# Patient Record
Sex: FEMALE | Race: WHITE | NOT HISPANIC OR LATINO | Employment: OTHER | URBAN - METROPOLITAN AREA
[De-identification: names, ages, dates, MRNs, and addresses within clinical notes are randomized per-mention and may not be internally consistent; named-entity substitution may affect disease eponyms.]

---

## 2020-12-31 ENCOUNTER — TRANSFERRED RECORDS (OUTPATIENT)
Dept: HEALTH INFORMATION MANAGEMENT | Facility: CLINIC | Age: 85
End: 2020-12-31

## 2021-03-26 ENCOUNTER — TRANSFERRED RECORDS (OUTPATIENT)
Dept: HEALTH INFORMATION MANAGEMENT | Facility: CLINIC | Age: 86
End: 2021-03-26

## 2021-06-21 ENCOUNTER — TRANSFERRED RECORDS (OUTPATIENT)
Dept: HEALTH INFORMATION MANAGEMENT | Facility: CLINIC | Age: 86
End: 2021-06-21

## 2021-06-25 ENCOUNTER — TRANSFERRED RECORDS (OUTPATIENT)
Dept: HEALTH INFORMATION MANAGEMENT | Facility: CLINIC | Age: 86
End: 2021-06-25

## 2021-06-28 ENCOUNTER — TRANSFERRED RECORDS (OUTPATIENT)
Dept: HEALTH INFORMATION MANAGEMENT | Facility: CLINIC | Age: 86
End: 2021-06-28

## 2021-07-01 ENCOUNTER — OFFICE VISIT (OUTPATIENT)
Dept: RADIATION THERAPY | Facility: OUTPATIENT CENTER | Age: 86
End: 2021-07-01
Payer: COMMERCIAL

## 2021-07-01 VITALS — DIASTOLIC BLOOD PRESSURE: 88 MMHG | HEART RATE: 65 BPM | SYSTOLIC BLOOD PRESSURE: 148 MMHG

## 2021-07-01 DIAGNOSIS — C18.9 MALIGNANT NEOPLASM OF COLON, UNSPECIFIED PART OF COLON (H): Primary | ICD-10-CM

## 2021-07-01 RX ORDER — METOPROLOL TARTRATE 50 MG
1 TABLET ORAL 2 TIMES DAILY
COMMUNITY

## 2021-07-01 RX ORDER — TRIAMCINOLONE ACETONIDE 1 MG/ML
LOTION TOPICAL
COMMUNITY

## 2021-07-01 RX ORDER — PRAMIPEXOLE DIHYDROCHLORIDE 0.25 MG/1
1-2 TABLET ORAL AT BEDTIME
COMMUNITY

## 2021-07-01 RX ORDER — FLUTICASONE PROPIONATE 50 MCG
SPRAY, SUSPENSION (ML) NASAL
COMMUNITY

## 2021-07-01 RX ORDER — HYDROCHLOROTHIAZIDE 12.5 MG/1
1 TABLET ORAL DAILY
COMMUNITY

## 2021-07-01 RX ORDER — SIMVASTATIN 20 MG
1 TABLET ORAL DAILY
COMMUNITY

## 2021-07-01 NOTE — PROGRESS NOTES
Consultation Note    Name: Nubia Moreno MRN: [id number]   : 1934   Date of Service: 2021      Reason for consultation: Consideration of palliative radiation therapy to the lymph node oligometastatic sites in the setting of diffuse metastatic adenocarcinoma of colon with hepatic and multiple lymphadenopathy status post mood response on systemic therapy.    History of Present Illness   Ms. 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  Her most recent imaging showed progression on pembrolizumab monotherapy.     Her oncologic history started in 2017 where she presented with unintentional 20 Ib weight loss. Lab work-up showed microcytic anemia. A colonoscopy on 7/15/2017 showed multiple lesions occupying the cecum, hepatic flexure and ascending colon. Biopsy was obtained and showed poorly differentiated adenocarcinoma in the hepatic flexure and moderately differentiated adenocarcinoma in the cecal and descending colon areas.  Staging CT abdomen and pelvis on 2017 showed evidence of multiple retroperitoneal lymph nodes, right ovarian mass as well as a 2 cm lesion in the dome of the liver. CT chest showed no evidence of pulmonary metastasis.      The patient underwent subtotal colectomy on 2017. Surgical pathology showed 4 foci of tumors with a 4.5 cm high-grade hepatic flexure adenocarcinoma, 3 cm cecal tumor, 3 cm ascending colon tumor and 1.6 cm descending colon low-grade adenocarcinoma. There was evident LVSI in the hepatic flexure and cecal primary as well as positive PNI in the cecal primary tumors.  There was positive 6/24 dissected nodes, negative final surgical margins.  Genetic analysis showed K-parvez wild-type and BRAF mutated.  CEA was 13.6 on 1017.    Staging PET/CT on  8/25/2017 showed multiple FDG avid liver metastasis, retroperitoneal lymphadenopathy, suspicious right hilar node as well as suspicious left 10th rib uptake.  CT-guided biopsy from retroperitoneal lymph node on 9/8/2017 was consistent with metastatic adenocarcinoma of colon primary.    The patient completed 6 cycles of Xeloda and Avastin (9/20/2017-1/24/2018).  Interval imaging on 4/13/2018 showed mixed response involving enlarged mesenteric lymphadenopathy and stable retroperitoneal, periportal and pericaval lymph nodes.    She was then switched to pembrolizumab monotherapy since 4/23/2018.  She completed 10 cycles (last cycle on 6/21/2021).  Most recent interval imaging on 6/25/2021 demonstrated interval increase in size of lymph node in the slava hepatis (6.8 cm from 3.2 cm) and left iliac chain (2.2 cm from 1.8 cm) consistent with disease progression.  The patient was referred to us for consideration of radiation therapy to these areas.    On interview today, she was accompanied by her grandson.  She stated that she has been doing well.  She denied any issues or side effects related to her immunotherapy.  She denied any recent fever, chills, abdominal pain, jaundice, nausea, vomiting, urinary or bowel symptoms, pelvic pain, weight or appetite changes.  She had a history of meningioma status post resection in 2005.  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 Since that time, she has been using wheelchair.  She otherwise denied any recent headaches, weakness, numbness, loss of urinary or bowel control.    Past Medical History:  Epigastric pain  Acute blood loss  Left shoulder pain  Allergic rhinitis  Meningioma (status post surgical resection on 10/2015)  Bilateral inguinal hernia  Chronic cough  Essential hypertension  Fatigue  Low back pain  Restless leg syndrome  Polymyalgia rheumatica      Past Surgical History:  Craniotomy for meningioma resection (10/2015)  Subtotal  colectomy 8/1/2017    Chemotherapy History:  Per HPI    Radiation History:  None     Implanted Cardiac Devices: No    Medications:  Fluconazole  Hydrochlorothiazide  Hydrocortisone  Imodium  Omeprazole  Ondansetron  Mirapex  Zocor    Allergies:  Cortisone     Review of Systems   A 10-point review of systems was performed. Pertinent findings are noted in the HPI.    Physical Exam   ECOG Status: 2    Vitals:  BP (!) 148/88 (BP Location: Left arm, Cuff Size: Adult Large)   Pulse 65     Gen: Alert, in NAD  Head: NC/AT  Eyes: PERRL, EOMI, sclera anicteric  Ears: No external auricular lesions  Nose/sinus: No rhinorrhea or epistaxis  Oral cavity/oropharynx: MMM, no visible oral cavity lesions, FOM and BOT are soft to palpation  Neck: Full ROM, supple, no palpable adenopathy  Pulm: No wheezing, stridor or respiratory distress  CV: Extremities are warm and well-perfused, no cyanosis, no pedal edema  Abdominal: Normal bowel sounds, soft, nontender, no masses  Musculoskeletal: Normal bulk and tone  Skin: Normal color and turgor  Neuro: A/Ox3, CN II-XII intact.    Imaging/Path/Labs   Imaging: Reviewed    Path: Reviewed    Labs: Reviewed     Assessment    Ms. Moreno is a 87 year old female with known diagnosis of widely metastatic adenocarcinoma of colon status post surgery and multiple lines of systemic therapy.  Most recent imaging showed evidence of disease progression on pembrolizumab monotherapy with almost double in size of slava hepatic and mild increase of size of left iliac lymph nodes.      Plan   We discussed with the patient in detail the role of radiation therapy in the metastatic setting. Palliative radiation therapy is basically delivered aiming at palliation/control of symptoms and prevention of further disease progression to achieve optimal local control outcomes.    In the light of rapid rate of growth of slava hepatic lymph nodes fairly more than left internal iliac lymph nodes, we plan to treat the slava  hepatic site while watching the left iliac lesion aiming at prevention of further disease progression since she does not have any current symptoms.    The radiation treatment will be delivered in 5 treatments targeting the enlarged node. We would like to do a reevaluation in 3 months for determination of the response to radiation treatment as well as reassessment of the left iliac node.  We hope for achievement of abscopal effect when using radiation treatment and Keytruda together.  If the scans showed progression of the left iliac lymph node, we might consider another course of palliative radiotherapy to this site down the road.    We discussed the rationale, logistics, alternatives, and potential side effects associated with the treatment including but not limited to fatigue, nausea, vomiting, radiation-induced liver disease, liver failure, and nephropathy. The patient will keep follow-up with medical oncology for systemic management options.  At end of discussion, the patient agreed with the plan and signed the treatment consent.  We will do the simulation on next Monday, 07/04/2021.  She will start her treatment and to that 3 days later.    The patient and his grandson had many questions during our conversation that were answered to their satisfaction and verbalized understanding.     The patient was seen and discussed with staff, Dr. Ramos. Thank you for involving us in the care of this patient. Please feel free to contact us with questions or concerns at any time.    Matthias Cruz MD  PGY-5 Resident, Radiation Oncology  [facility]  Phone:[phone]  Pager:[phone]    I was present with the resident during the visit. I discussed the case with the resident and agree with the note as documented by the resident.    Caleb Ramos M.D.  Department of Radiation Oncology  [facility]

## 2021-07-01 NOTE — LETTER
2021         RE: Nubia Moreno  [address] [place] [id number]        Dear Colleague,    Thank you for referring your patient, Nubia Moreno, to the [facility]. Please see a copy of my visit note below.              Consultation Note    Name: Nubia Moreno MRN: [id number]   : 1934   Date of Service: 2021      Reason for consultation: Consideration of palliative radiation therapy to the lymph node oligometastatic sites in the setting of diffuse metastatic adenocarcinoma of colon with hepatic and multiple lymphadenopathy status post mood response on systemic therapy.    History of Present Illness   Ms. 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  Her most recent imaging showed progression on pembrolizumab monotherapy.     Her oncologic history started in 2017 where she presented with unintentional 20 Ib weight loss. Lab work-up showed microcytic anemia. A colonoscopy on 7/15/2017 showed multiple lesions occupying the cecum, hepatic flexure and ascending colon. Biopsy was obtained and showed poorly differentiated adenocarcinoma in the hepatic flexure and moderately differentiated adenocarcinoma in the cecal and descending colon areas.  Staging CT abdomen and pelvis on 2017 showed evidence of multiple retroperitoneal lymph nodes, right ovarian mass as well as a 2 cm lesion in the dome of the liver. CT chest showed no evidence of pulmonary metastasis.      The patient underwent subtotal colectomy on 2017. Surgical pathology showed 4 foci of tumors with a 4.5 cm high-grade hepatic flexure adenocarcinoma, 3 cm cecal tumor, 3 cm ascending colon tumor and 1.6 cm descending colon low-grade adenocarcinoma. There was evident LVSI in the hepatic flexure and cecal  primary as well as positive PNI in the cecal primary tumors.  There was positive 6/24 dissected nodes, negative final surgical margins.  Genetic analysis showed K-parvez wild-type and BRAF mutated.  CEA was 13.6 on 7/13/1017.    Staging PET/CT on 8/25/2017 showed multiple FDG avid liver metastasis, retroperitoneal lymphadenopathy, suspicious right hilar node as well as suspicious left 10th rib uptake.  CT-guided biopsy from retroperitoneal lymph node on 9/8/2017 was consistent with metastatic adenocarcinoma of colon primary.    The patient completed 6 cycles of Xeloda and Avastin (9/20/2017-1/24/2018).  Interval imaging on 4/13/2018 showed mixed response involving enlarged mesenteric lymphadenopathy and stable retroperitoneal, periportal and pericaval lymph nodes.    She was then switched to pembrolizumab monotherapy since 4/23/2018.  She completed 10 cycles (last cycle on 6/21/2021).  Most recent interval imaging on 6/25/2021 demonstrated interval increase in size of lymph node in the slava hepatis (6.8 cm from 3.2 cm) and left iliac chain (2.2 cm from 1.8 cm) consistent with disease progression.  The patient was referred to us for consideration of radiation therapy to these areas.    On interview today, she was accompanied by her grandson.  She stated that she has been doing well.  She denied any issues or side effects related to her immunotherapy.  She denied any recent fever, chills, abdominal pain, jaundice, nausea, vomiting, urinary or bowel symptoms, pelvic pain, weight or appetite changes.  She had a history of meningioma status post resection in 2005.  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 Since that time, she has been using wheelchair.  She otherwise denied any recent headaches, weakness, numbness, loss of urinary or bowel control.    Past Medical History:  Epigastric pain  Acute blood loss  Left shoulder pain  Allergic rhinitis  Meningioma (status post surgical  resection on 10/2015)  Bilateral inguinal hernia  Chronic cough  Essential hypertension  Fatigue  Low back pain  Restless leg syndrome  Polymyalgia rheumatica      Past Surgical History:  Craniotomy for meningioma resection (10/2015)  Subtotal colectomy 8/1/2017    Chemotherapy History:  Per HPI    Radiation History:  None     Implanted Cardiac Devices: No    Medications:  Fluconazole  Hydrochlorothiazide  Hydrocortisone  Imodium  Omeprazole  Ondansetron  Mirapex  Zocor    Allergies:  Cortisone     Review of Systems   A 10-point review of systems was performed. Pertinent findings are noted in the HPI.    Physical Exam   ECOG Status: 2    Vitals:  BP (!) 148/88 (BP Location: Left arm, Cuff Size: Adult Large)   Pulse 65     Gen: Alert, in NAD  Head: NC/AT  Eyes: PERRL, EOMI, sclera anicteric  Ears: No external auricular lesions  Nose/sinus: No rhinorrhea or epistaxis  Oral cavity/oropharynx: MMM, no visible oral cavity lesions, FOM and BOT are soft to palpation  Neck: Full ROM, supple, no palpable adenopathy  Pulm: No wheezing, stridor or respiratory distress  CV: Extremities are warm and well-perfused, no cyanosis, no pedal edema  Abdominal: Normal bowel sounds, soft, nontender, no masses  Musculoskeletal: Normal bulk and tone  Skin: Normal color and turgor  Neuro: A/Ox3, CN II-XII intact.    Imaging/Path/Labs   Imaging: Reviewed    Path: Reviewed    Labs: Reviewed     Assessment    Ms. Moreno is a 87 year old female with known diagnosis of widely metastatic adenocarcinoma of colon status post surgery and multiple lines of systemic therapy.  Most recent imaging showed evidence of disease progression on pembrolizumab monotherapy with almost double in size of slava hepatic and mild increase of size of left iliac lymph nodes.      Plan   We discussed with the patient in detail the role of radiation therapy in the metastatic setting. Palliative radiation therapy is basically delivered aiming at palliation/control of  symptoms and prevention of further disease progression to achieve optimal local control outcomes.    In the light of rapid rate of growth of slava hepatic lymph nodes fairly more than left internal iliac lymph nodes, we plan to treat the slava hepatic site while watching the left iliac lesion aiming at prevention of further disease progression since she does not have any current symptoms.    The radiation treatment will be delivered in 5 treatments targeting the enlarged node. We would like to do a reevaluation in 3 months for determination of the response to radiation treatment as well as reassessment of the left iliac node.  We hope for achievement of abscopal effect when using radiation treatment and Keytruda together.  If the scans showed progression of the left iliac lymph node, we might consider another course of palliative radiotherapy to this site down the road.    We discussed the rationale, logistics, alternatives, and potential side effects associated with the treatment including but not limited to fatigue, nausea, vomiting, radiation-induced liver disease, liver failure, and nephropathy. The patient will keep follow-up with medical oncology for systemic management options.  At end of discussion, the patient agreed with the plan and signed the treatment consent.  We will do the simulation on next Monday, 07/04/2021.  She will start her treatment and to that 3 days later.    The patient and his grandson had many questions during our conversation that were answered to their satisfaction and verbalized understanding.     The patient was seen and discussed with staff, Dr. Ramos. Thank you for involving us in the care of this patient. Please feel free to contact us with questions or concerns at any time.    Matthias Cruz MD  PGY-5 Resident, Radiation Oncology  [facility]  Phone:[phone]  Pager:[phone]    I was present with the resident during the visit. I discussed the case  with the resident and agree with the note as documented by the resident.    Caleb Ramos M.D.  Department of Radiation Oncology  [facility]

## 2021-07-07 ENCOUNTER — APPOINTMENT (OUTPATIENT)
Dept: RADIATION ONCOLOGY | Facility: CLINIC | Age: 86
End: 2021-07-07
Payer: COMMERCIAL

## 2021-07-07 PROCEDURE — 77332 RADIATION TREATMENT AID(S): CPT | Performed by: RADIOLOGY

## 2021-07-07 PROCEDURE — 77290 THER RAD SIMULAJ FIELD CPLX: CPT | Performed by: RADIOLOGY

## 2021-07-12 ENCOUNTER — APPOINTMENT (OUTPATIENT)
Dept: RADIATION THERAPY | Facility: OUTPATIENT CENTER | Age: 86
End: 2021-07-12
Payer: COMMERCIAL

## 2021-07-13 ENCOUNTER — APPOINTMENT (OUTPATIENT)
Dept: RADIATION THERAPY | Facility: OUTPATIENT CENTER | Age: 86
End: 2021-07-13
Payer: COMMERCIAL

## 2021-07-14 ENCOUNTER — OFFICE VISIT (OUTPATIENT)
Dept: RADIATION THERAPY | Facility: OUTPATIENT CENTER | Age: 86
End: 2021-07-14
Payer: COMMERCIAL

## 2021-07-14 ENCOUNTER — APPOINTMENT (OUTPATIENT)
Dept: RADIATION THERAPY | Facility: OUTPATIENT CENTER | Age: 86
End: 2021-07-14
Payer: COMMERCIAL

## 2021-07-14 VITALS
RESPIRATION RATE: 18 BRPM | OXYGEN SATURATION: 92 % | DIASTOLIC BLOOD PRESSURE: 82 MMHG | SYSTOLIC BLOOD PRESSURE: 165 MMHG | HEART RATE: 95 BPM

## 2021-07-14 DIAGNOSIS — R11.0 NAUSEA: Primary | ICD-10-CM

## 2021-07-14 DIAGNOSIS — R63.0 LOSS OF APPETITE: ICD-10-CM

## 2021-07-14 RX ORDER — DEXAMETHASONE 2 MG/1
2 TABLET ORAL DAILY
Qty: 10 TABLET | Refills: 0 | Status: SHIPPED | OUTPATIENT
Start: 2021-07-14 | End: 2021-07-28

## 2021-07-14 RX ORDER — ONDANSETRON 8 MG/1
8 TABLET, FILM COATED ORAL EVERY 8 HOURS PRN
Qty: 30 TABLET | Refills: 1 | Status: SHIPPED | OUTPATIENT
Start: 2021-07-14

## 2021-07-14 NOTE — PROGRESS NOTES
[facility]  SPECIALIZING IN BREAKTHROUGHS  Radiation Oncology    On Treatment Visit Note      Nubia Moreno      Date: 2021   MRN: [id number]   : 1934  Diagnosis: metastatic colon cancer      Reason for Visit:  On Radiation Treatment Visit     Treatment Summary to Date  Treatment Site: Sonali-portal lymph node Current Dose: 1200/2000 cGy Fractions: 3/5      Chemotherapy  Chemo concurrent with radx?: No  Oncologist: Dr. Juarez  Drug Name/Frequency 1: Keytruda every 6 wks    Subjective:  More fatigue since starting RT. Some reduced appetite. Mild nausea, no emesis. No diarrhea.     Nursing ROS:   Nutrition Alteration  Diet Type: Patient's Preference  Skin  Skin Reaction: 0 - No changes     ENT and Mouth Exam  Esophagitis: 0 - None  Cardiovascular  Respiratory effort: 1 - Normal - without distress  Gastrointestinal  Nausea: 1 - One to two episodes of nausea/24  GI Note: pt notes nausea and fatigue. son here with her and thinks stress/anxiety of new treatment/unknowns may be a factor in nausea, eating less, and fatigue as well. pt agrees        Pain Assessment  0-10 Pain Scale: 0      Objective:   BP (!) 165/82   Pulse 95   Resp 18   SpO2 92%   NAD, sitting in wheelchair  Fatigued    Labs:  CBC RESULTS: No results for input(s): WBC, RBC, HGB, HCT, MCV, MCH, MCHC, RDW, PLT in the last 93951 hours.  ELECTROLYTES:  No results for input(s): NA, POTASSIUM, CHLORIDE, GARCÍA, CO2, BUN, CR, GLC in the last 36711 hours.    Assessment:  Ms. Moreno is a 87 year old female with known diagnosis of widely metastatic adenocarcinoma of colon status post surgery and multiple lines of systemic therapy.  Most recent imaging showed evidence of disease progression on pembrolizumab monotherapy with most notable slava hepatic node. She is undergoing palliative RT.      Tolerating radiation therapy well.  All questions and concerns addressed.    Plan:   1. Continue current therapy.    2. Nausea. Zofran prior to RT  treatments and then PRN thereafter. Plan to continue anti-emetic for about 1 week after RT is completed prophylactically.   3. Poor appetite/fatigue. Decadron 2mg x 10 days, then off.       Mosaiq chart and setup information reviewed  Ports checked         Educational Topic Discussed  Education Instructions: MD to review follow up plan      Caleb Ramos MD

## 2021-07-14 NOTE — LETTER
2021         RE: Nubia Moreno  [address] [place] [id number]        Dear Colleague,    Thank you for referring your patient, Nubia Moreno, to the [facility]. Please see a copy of my visit note below.    [facility]  SPECIALIZING IN BREAKTHROUGHS  Radiation Oncology    On Treatment Visit Note      Nubia Moreno      Date: 2021   MRN: [id number]   : 1934  Diagnosis: metastatic colon cancer      Reason for Visit:  On Radiation Treatment Visit     Treatment Summary to Date  Treatment Site: Sonali-portal lymph node Current Dose: 1200/2000 cGy Fractions: 3/5      Chemotherapy  Chemo concurrent with radx?: No  Oncologist: Dr. Juarez  Drug Name/Frequency 1: Keytruda every 6 wks    Subjective:  More fatigue since starting RT. Some reduced appetite. Mild nausea, no emesis. No diarrhea.     Nursing ROS:   Nutrition Alteration  Diet Type: Patient's Preference  Skin  Skin Reaction: 0 - No changes     ENT and Mouth Exam  Esophagitis: 0 - None  Cardiovascular  Respiratory effort: 1 - Normal - without distress  Gastrointestinal  Nausea: 1 - One to two episodes of nausea/24  GI Note: pt notes nausea and fatigue. son here with her and thinks stress/anxiety of new treatment/unknowns may be a factor in nausea, eating less, and fatigue as well. pt agrees        Pain Assessment  0-10 Pain Scale: 0      Objective:   BP (!) 165/82   Pulse 95   Resp 18   SpO2 92%   NAD, sitting in wheelchair  Fatigued    Labs:  CBC RESULTS: No results for input(s): WBC, RBC, HGB, HCT, MCV, MCH, MCHC, RDW, PLT in the last 67370 hours.  ELECTROLYTES:  No results for input(s): NA, POTASSIUM, CHLORIDE, GARCÍA, CO2, BUN, CR, GLC in the last 34953 hours.    Assessment:  Ms. Moreno is a 87 year old female with known diagnosis of widely metastatic adenocarcinoma of colon status post surgery and multiple lines of systemic therapy.  Most recent imaging showed evidence of disease progression on  pembrolizumab monotherapy with most notable slava hepatic node. She is undergoing palliative RT.      Tolerating radiation therapy well.  All questions and concerns addressed.    Plan:   1. Continue current therapy.    2. Nausea. Zofran prior to RT treatments and then PRN thereafter. Plan to continue anti-emetic for about 1 week after RT is completed prophylactically.   3. Poor appetite/fatigue. Decadron 2mg x 10 days, then off.       Mosaiq chart and setup information reviewed  Ports checked         Educational Topic Discussed  Education Instructions: MD to review follow up plan      Caleb Ramos MD

## 2021-07-15 ENCOUNTER — APPOINTMENT (OUTPATIENT)
Dept: RADIATION THERAPY | Facility: OUTPATIENT CENTER | Age: 86
End: 2021-07-15
Payer: COMMERCIAL

## 2021-07-16 ENCOUNTER — APPOINTMENT (OUTPATIENT)
Dept: RADIATION THERAPY | Facility: OUTPATIENT CENTER | Age: 86
End: 2021-07-16
Payer: COMMERCIAL

## 2021-07-21 PROBLEM — C18.9 COLON CANCER METASTASIZED TO LIVER (H): Status: ACTIVE | Noted: 2017-07-06

## 2021-07-21 PROBLEM — C78.7 COLON CANCER METASTASIZED TO LIVER (H): Status: ACTIVE | Noted: 2017-07-06

## 2021-07-28 ENCOUNTER — VIRTUAL VISIT (OUTPATIENT)
Dept: RADIATION THERAPY | Facility: OUTPATIENT CENTER | Age: 86
End: 2021-07-28
Payer: COMMERCIAL

## 2021-07-28 ENCOUNTER — DOCUMENTATION ONLY (OUTPATIENT)
Dept: RADIATION THERAPY | Facility: OUTPATIENT CENTER | Age: 86
End: 2021-07-28

## 2021-07-28 DIAGNOSIS — C18.9 MALIGNANT NEOPLASM OF COLON, UNSPECIFIED PART OF COLON (H): Primary | ICD-10-CM

## 2021-07-28 NOTE — NURSING NOTE
Nubia is a 87 year old who is being evaluated via a billable telephone visit.      What phone number would you like to be contacted at? home  How would you like to obtain your AVS? MyChart   FOLLOW-UP VISIT    Patient Name: Nubia Moreno      : 1934     Age: 87 year old        ______________________________________________________________________________     Chief Complaint   Patient presents with     Radiation Therapy     Follow up telephone visit, metastatic colon cancer     There were no vitals taken for this visit.  Date Radiation Completed: 21    Pain  Denies    Meds  Current Med List Reviewed: Yes  Medication Note:     Imaging  None    On Chemo?: will resume infusions next week  Nausea: No  Bowel: diarrhea since completion of RT, daily or every other day  Bladder: negative  Skin: no issues  Energy Level: fair    Other Appointments:     Date  Oncologist: Ann Follow up next week   Surgeon:      Other Notes:   Phone call duration: 8 minutes

## 2021-07-28 NOTE — PROGRESS NOTES
Radiotherapy Treatment Summary              PATIENT: Nubia Moreno  MEDICAL RECORD NO: [id number]   : 1934    DIAGNOSIS: Metastatic colon cancer  INTENT OF RADIOTHERAPY: Palliative   PATHOLOGY: Adenocarcinoma                                    STAGE: M1  CONCURRENT SYSTEMIC THERAPY:  Pembrolizumab        ONCOLOGIC HISTORY:  Ms. Moreno is a 87 year old female with known diagnosis of widely metastatic adenocarcinoma of colon status post surgery and multiple lines of systemic therapy.  Most recent imaging showed evidence of disease progression on pembrolizumab monotherapy with most notable slava hepatic node. She underwent palliative RT.            SITE OF TREATMENT: Abdomen    DATES  OF TREATMENT: 21 to 21    TOTAL DOSE OF TREATMENT: 2000 cGy in 5 fractions    DOSE PER FRACTION OF TREATMENT: 400 cGy       COMMENT/TOXICITY:       Grade 2 GI               PAIN MANAGEMENT:                           None    FOLLOW UP PLAN:  1. RTC in 2 weeks  CC  Patient Care Team:  João Lassiter MD as PCP - General (Family Medicine)  Caleb Ramos MD as MD (Radiation Oncology)  Caleb Ramos MD as Assigned Cancer Care Provider       Caleb Ramos M.D.  Department of Radiation Oncology  [facility]

## 2021-07-28 NOTE — LETTER
7/28/2021         RE: Nubia Moreno  [address] [facility] [address]        Dear Colleague,    Thank you for referring your patient, Nubia Moreno, to the [facility]. Please see a copy of my visit note below.    Radiation Oncology Progress Note    HPI: Ms. Moreno is a 87 year old female with known diagnosis of widely metastatic adenocarcinoma of colon status post surgery and multiple lines of systemic therapy.  Most recent imaging showed evidence of disease progression on pembrolizumab monotherapy with most notable slava hepatic node. She underwent palliative RT.            Radiation treatment  1. 7/12/21 to 7/16/21: Abdomen (marlen-portal node), 20 Gy in 5 fractions    At the end of treatment, patient noted reduced appetite and mild nausea. Started on low dose decadron and anti-emetic, now improved. Has completed course of steroid. Noticed diarrhea about 1 week post RT completion. Now slightly better. Fatigued, but slightly better as well.     Plan:  1. Diarrhea. Likely RT induced.  Imodium PRN. Will continue to turn the corner and improve.   2. Steroid course for low appetite completed.   3. Anti-emetic. Can discontinue given resolution of nausea symptom.   4. Continue systemic therapy and oncologic surveillance with medical oncology team (Dr. Juarez).   5. RTC in 2 weeks.    Due to the concerns around COVID-19 and adhering to social distancing we conduct this visit over the telephone. Telephone call lasted 10 minutes.       Caleb Ramos M.D.  Department of Radiation Oncology  [facility]

## 2021-07-28 NOTE — PROGRESS NOTES
Radiation Oncology Progress Note    HPI: Ms. Moreno is a 87 year old female with known diagnosis of widely metastatic adenocarcinoma of colon status post surgery and multiple lines of systemic therapy.  Most recent imaging showed evidence of disease progression on pembrolizumab monotherapy with most notable slava hepatic node. She underwent palliative RT.            Radiation treatment  1. 7/12/21 to 7/16/21: Abdomen (marlen-portal node), 20 Gy in 5 fractions    At the end of treatment, patient noted reduced appetite and mild nausea. Started on low dose decadron and anti-emetic, now improved. Has completed course of steroid. Noticed diarrhea about 1 week post RT completion. Now slightly better. Fatigued, but slightly better as well.     Plan:  1. Diarrhea. Likely RT induced.  Imodium PRN. Will continue to turn the corner and improve.   2. Steroid course for low appetite completed.   3. Anti-emetic. Can discontinue given resolution of nausea symptom.   4. Continue systemic therapy and oncologic surveillance with medical oncology team (Dr. Juarez).   5. RTC in 2 weeks.    Due to the concerns around COVID-19 and adhering to social distancing we conduct this visit over the telephone. Telephone call lasted 10 minutes.       Caleb Ramos M.D.  Department of Radiation Oncology  [facility]

## 2021-08-01 ENCOUNTER — HEALTH MAINTENANCE LETTER (OUTPATIENT)
Age: 86
End: 2021-08-01

## 2021-08-13 ENCOUNTER — VIRTUAL VISIT (OUTPATIENT)
Dept: RADIATION THERAPY | Facility: OUTPATIENT CENTER | Age: 86
End: 2021-08-13
Payer: COMMERCIAL

## 2021-08-13 DIAGNOSIS — C18.9 MALIGNANT NEOPLASM OF COLON, UNSPECIFIED PART OF COLON (H): Primary | ICD-10-CM

## 2021-08-13 NOTE — PROGRESS NOTES
Radiation Oncology Progress Note    HPI: Ms. Moreno is a 87 year old female with known diagnosis of widely metastatic adenocarcinoma of colon status post surgery and multiple lines of systemic therapy.  Most recent imaging showed evidence of disease progression on pembrolizumab monotherapy with most notable slava hepatic node. She underwent palliative RT.            Radiation treatment  1. 7/12/21 to 7/16/21: Abdomen (marlen-portal node), 20 Gy in 5 fractions    At the end of treatment, patient noted reduced appetite and mild nausea. Started on low dose decadron and anti-emetic, now improved. Has completed course of steroid. Noticed diarrhea about 1 week post RT completion, now resolved. Fatigued, improving as well.     Will undergo re-staging scans in September 2021.     Plan:  1. Continue systemic therapy and oncologic surveillance with medical oncology team.  2. RTC as needed.     Due to the concerns around COVID-19 and adhering to social distancing we conduct this visit over the telephone. Telephone call lasted 10 minutes.       Caleb Ramos M.D.  Department of Radiation Oncology  [facility]

## 2021-08-13 NOTE — LETTER
8/13/2021         RE: Nubia Moreno  [address] [facility] [address]        Dear Colleague,    Thank you for referring your patient, Nubia Moreno, to the [facility]. Please see a copy of my visit note below.    Radiation Oncology Progress Note    HPI: Ms. Moreno is a 87 year old female with known diagnosis of widely metastatic adenocarcinoma of colon status post surgery and multiple lines of systemic therapy.  Most recent imaging showed evidence of disease progression on pembrolizumab monotherapy with most notable slava hepatic node. She underwent palliative RT.            Radiation treatment  1. 7/12/21 to 7/16/21: Abdomen (marlen-portal node), 20 Gy in 5 fractions    At the end of treatment, patient noted reduced appetite and mild nausea. Started on low dose decadron and anti-emetic, now improved. Has completed course of steroid. Noticed diarrhea about 1 week post RT completion, now resolved. Fatigued, improving as well.     Will undergo re-staging scans in September 2021.     Plan:  1. Continue systemic therapy and oncologic surveillance with medical oncology team.  2. RTC as needed.     Due to the concerns around COVID-19 and adhering to social distancing we conduct this visit over the telephone. Telephone call lasted 10 minutes.       Caleb Ramos M.D.  Department of Radiation Oncology  [facility]           Again, thank you for allowing me to participate in the care of your patient.        Sincerely,        Caleb Ramos MD

## 2021-08-13 NOTE — LETTER
8/13/2021      RE: Nubia Moreno  [address] [facility] [address]       Radiation Oncology Progress Note    HPI: Ms. Moreno is a 87 year old female with known diagnosis of widely metastatic adenocarcinoma of colon status post surgery and multiple lines of systemic therapy.  Most recent imaging showed evidence of disease progression on pembrolizumab monotherapy with most notable slava hepatic node. She underwent palliative RT.            Radiation treatment  1. 7/12/21 to 7/16/21: Abdomen (marlen-portal node), 20 Gy in 5 fractions    At the end of treatment, patient noted reduced appetite and mild nausea. Started on low dose decadron and anti-emetic, now improved. Has completed course of steroid. Noticed diarrhea about 1 week post RT completion, now resolved. Fatigued, improving as well.     Will undergo re-staging scans in September 2021.     Plan:  1. Continue systemic therapy and oncologic surveillance with medical oncology team.  2. RTC as needed.     Due to the concerns around COVID-19 and adhering to social distancing we conduct this visit over the telephone. Telephone call lasted 10 minutes.       Caleb Ramos M.D.  Department of Radiation Oncology  [facility]

## 2021-08-13 NOTE — NURSING NOTE
Nubia is a 87 year old who is being evaluated via a billable telephone visit.      What phone number would you like to be contacted at? Ayden (son's) cell  How would you like to obtain your AVS? Mail a copy  Phone call duration: 4 minutes      Oncology Rooming Note    August 13, 2021 1:15 PM   Nubia Moreno is a 87 year old female who presents for:    Chief Complaint   Patient presents with     Radiation Therapy     phone follow up     Initial Vitals: There were no vitals taken for this visit. There is no height or weight on file to calculate BMI. There is no height or weight on file to calculate BSA.  Data Unavailable Comment: Data Unavailable   No LMP recorded.  Allergies reviewed: Yes  Medications reviewed: Yes    Medications: Medication refills not needed today.  Pharmacy name entered into [employer]: [id number] [place], [place] - [address]    Clinical concerns: feeling much improved MD was notified.      Shagufta Malone RN

## 2021-09-26 ENCOUNTER — HEALTH MAINTENANCE LETTER (OUTPATIENT)
Age: 86
End: 2021-09-26

## 2022-08-28 ENCOUNTER — HEALTH MAINTENANCE LETTER (OUTPATIENT)
Age: 87
End: 2022-08-28

## 2022-12-14 NOTE — PROGRESS NOTES
Department of Radiation Oncology  Radiation Therapy Center  [facility]  [address], [address]  [place] [address]  [phone]       Consultation Note    Name: Nubia Moreno MRN: [id number]   : 1934   Date of Service: 2022 Referring: No referring provider defined for this encounter.     Reason for consultation: Consideration of palliative radiation therapy to the left iliac lymph node oligometastatic site in the setting of diffuse metastatic adenocarcinoma of colon with hepatic and multiple lymphadenopathy responsive on systemic therapy.    History of Present Illness   Ms. 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 Her most recent imaging showed progression on pembrolizumab monotherapy.      Her oncologic history started in 2017 where she presented with unintentional 20 Ib weight loss. Lab work-up showed microcytic anemia. A colonoscopy on 7/15/17 showed multiple lesions occupying the cecum, hepatic flexure and ascending colon. Biopsy was obtained and showed poorly differentiated adenocarcinoma in the hepatic flexure and moderately differentiated adenocarcinoma in the cecal and descending colon areas.  Staging CT abdomen and pelvis on 17 showed evidence of multiple retroperitoneal lymph nodes, right ovarian mass as well as a 2 cm lesion in the dome of the liver. CT chest showed no evidence of pulmonary metastasis.       The patient underwent subtotal colectomy on 17. Surgical pathology showed 4 foci of tumors with a 4.5 cm high-grade hepatic flexure adenocarcinoma, 3 cm cecal tumor, 3 cm ascending colon tumor and 1.6 cm descending colon low-grade adenocarcinoma. There was evident LVSI in the hepatic flexure and cecal primary as well as positive PNI in the  cecal primary tumors.  There was positive 6/24 dissected nodes, negative final surgical margins.  Genetic analysis showed K-parvez wild-type and BRAF mutated.  CEA was 13.6 on 7/13/17.     Staging PET/CT on 8/25/17 showed multiple FDG avid liver metastasis, retroperitoneal lymphadenopathy, suspicious right hilar node as well as suspicious left 10th rib uptake.  CT-guided biopsy from retroperitoneal lymph node on 9/8/2017 was consistent with metastatic adenocarcinoma of colon primary.     The patient completed 6 cycles of Xeloda and Avastin (9/20/17-1/24/18).  Interval imaging on 4/13/18 showed mixed response involving enlarged mesenteric lymphadenopathy and stable retroperitoneal, periportal and pericaval lymph nodes.     She was then switched to pembrolizumab monotherapy since 4/23/18. She completed 10 cycles (last cycle on 6/21/21). Interval imaging on 6/25/21 demonstrated interval increase in size of lymph node in the slava hepatis (6.8 cm from 3.2 cm) and left iliac chain (2.2 cm from 1.8 cm) consistent with disease progression. She was seen in our clinic at that time, and an agreement was reached to treat the slava hepatis node and monitor the left iliac node. The slava hepatic node was treated to targeted radiotherapy and received 2,000 cGy in 5 fractions (7/12/21--7/16/21).    She was continued on pembrolizumab monotherapy. Surveillance imaging studies (most recent PET/CT on 11/22/22) have since demonstrated gradual increase in the size of the left iliac node without noting other metastatic focus. A referral was made for her to again see us and discuss targeted radiotherapy to the left iliac node.    On interview, Ms. Moreno is doing well and has no pressing symptoms or concerns.     Past Medical History:   Epigastric pain  Acute blood loss  Left shoulder pain  Allergic rhinitis  Meningioma (status post surgical resection on 10/2015)  Bilateral inguinal hernia  Chronic cough  Essential  hypertension  Fatigue  Low back pain  Restless leg syndrome  Polymyalgia rheumatica    Past Surgical History:   Craniotomy for meningioma resection (10/2015)  Subtotal colectomy (08/2017)    Chemotherapy History:  Per HPI    Radiation History:  Per HPI    Pregnant: Post-menopausal  Implanted Cardiac Devices: No    Medications:  Current Outpatient Medications   Medication     apixaban ANTICOAGULANT (ELIQUIS) 2.5 MG tablet     hydrochlorothiazide (HYDRODIURIL) 12.5 MG tablet     metoprolol tartrate (LOPRESSOR) 50 MG tablet     omeprazole (PRILOSEC) 20 MG DR capsule     pramipexole (MIRAPEX) 0.25 MG tablet     simvastatin (ZOCOR) 20 MG tablet     fluticasone (FLONASE) 50 MCG/ACT nasal spray     ondansetron (ZOFRAN) 8 MG tablet     triamcinolone (KENALOG) 0.1 % external lotion     No current facility-administered medications for this visit.     Allergies: Cortisone    Social History:  Lives in [place], [place]    Family History:  No family history on file.    Review of Systems   A 10-point review of systems was performed. Pertinent findings are noted in the HPI.    Physical Exam   ECOG Status: 2-3  Vitals:  Pulse 67   Resp 18   Wt 59.4 kg (131 lb)   SpO2 97%   Gen: Alert, in NAD, sitting in wheelchair, frail  Head: NC/AT  Eyes: PERRL, EOMI, sclera anicteric  Pulm: No wheezing, stridor or respiratory distress  Musculoskeletal: Normal bulk and tone  Skin: Normal color and turgor  Neuro: A/Ox3, CN II-XII intact    Imaging/Path/Labs   Imaging: PET/CT 11/22/22  Bilateral iliac nodes with FDG uptake, the left iliac node has been progressing while the right iliac node has been stable in comparison to prior      Path: Reviewed    Labs: Reviewed    Assessment    Ms. Moreno is a 88 year old female with diffuse metastatic adenocarcinoma of colon overall well controlled on Keytruda presenting with an oligoprogressive site in the left iliac chain.    Plan   We discussed our recommendation is to proceed with targeted radiotherapy to  the left iliac node to confer durable disease control. We will observe the right iliac node given that it has been stable. The technical aspects, logistics and side effects anticipated were explained in details. Her treatment will likely consist of 5 once-daily fractions.    Ms. Moreno will see Dr. Aldana for follow up in 01/2023, but she understands the rationale for aggressive treatment is to see if this can keep her on Keytruda, a regimen she well tolerated. An informed consent was obtained. We will bring her back for simulation CT next week.    The patient was seen and discussed with staff, Dr. Ramos.    Kati Law MD  Resident, PGY-5  Department of Radiation Oncology  [facility]    I was present with the resident during the visit. I discussed the case with the resident and agree with the note as documented by the resident.    Caleb Ramos M.D.  Department of Radiation Oncology  [facility]

## 2022-12-22 ENCOUNTER — OFFICE VISIT (OUTPATIENT)
Dept: RADIATION THERAPY | Facility: OUTPATIENT CENTER | Age: 87
End: 2022-12-22

## 2022-12-22 VITALS — RESPIRATION RATE: 18 BRPM | HEART RATE: 67 BPM | WEIGHT: 131 LBS | OXYGEN SATURATION: 97 %

## 2022-12-22 DIAGNOSIS — C18.9 MALIGNANT NEOPLASM OF COLON, UNSPECIFIED PART OF COLON (H): Primary | ICD-10-CM

## 2022-12-22 ASSESSMENT — PAIN SCALES - GENERAL: PAINLEVEL: NO PAIN (0)

## 2022-12-22 NOTE — NURSING NOTE
"REASON FOR APPOINTMENT   Type of Cancer: metastatic colon cancer  Location: L iliac lymph nodes  Date of Symptom Onset: no symptoms - med onc md noted on scans -referred to discuss radiation    TREATMENT TO-DATE FOR THIS CANCER  Surgery ? 8/2017 colectomy   Chemotherapy ? Has been on Keytruda for about 5 yrs   Other Treatments for this Cancer ? Previous radiation at this location  7/16/21  - abdominal nodes                                  Discussion today about radiation to pelvis/L iliac node    PERSONAL HISTORY OF CANCER   Previous Cancer ? Per above   Prior Radiation ? Per above   Prior Chemotherapy ? Per above   Prior Hormonal Therapy ? no     RECENT IMAGING STUDIES  PET 11[place]22/[place] [facility]  [place] [facility] 11/4/22 and 7/1/22    REFERRALS NEEDED  None at this time    VITALS  Pulse 67   Resp 18   Wt 59.4 kg (131 lb)   SpO2 97%     PACEMAKER/IMPLANTED CARDIAC DEVICE no    PAIN  Denies    PSYCHOSOCIAL  Marital Status:   Patient lives in [place] with  (dementia) and son Ayden - full time care giver for Nubia.  Number of children:   Working status: retired  Do you feel safe in your home? Yes    REVIEW OF SYSTEMS  Skin: negative  Eyes: glasses  Ears/Nose/Throat: negative  Respiratory: Dyspnea on exertion  Cardiovascular: irregular heart beat/ on eliquis  Gastrointestinal: colectomy 2017 - small, frequent meals  Genitourinary: negative  Musculoskeletal: back pain and muscular weakness  Neurologic: negative  Psychiatric: negative  Hematologic/Lymphatic/Immunologic: generalized fatigue  Endocrine: negative    WOMEN ONLY  Any chance you may be pregnant: No      Radiation Oncology Patient Teaching    Current Concern: \" I generally feel good. I am tired and nap at times. I am interested in trying radiation to see if it would help\"    Person involved with teaching: Patient and Son - Ayden  Patient asked Questions: Yes  Patient was cooperative: Yes  Patient was receptive (willing to accept information given): " Yes    Education Assessment  Comprehension ability: Medium  Knowledge level: Medium  Factors affecting teaching: None    Education Materials Given  Radiation Therapy and You  Radiation to the Abdomen    Educational Topics Discussed  Side effects, Medications, Activity, Nutrition, Adjustment to illness and When to call MD/RN    Response To Teaching  More review necessary      Do you have an advanced directive or living will? No  Are you DNR/DNI? No

## 2022-12-22 NOTE — LETTER
2022         RE: Nubia Moreno  [address] [facility] [address]        Dear Colleague,    Thank you for referring your patient, Nubia Moreno, to the RAD[facility]ATION TH[facility]APY CENTER. Please see a copy of my visit note below.       Department of Radiation Oncology  Radiation Therapy Center  [facility]  [address], [address]  [place] [address]  [phone]       Consultation Note    Name: Nubia Moreno MRN: [id number]   : 1934   Date of Service: 2022 Referring: No referring provider defined for this encounter.     Reason for consultation: Consideration of palliative radiation therapy to the left iliac lymph node oligometastatic site in the setting of diffuse metastatic adenocarcinoma of colon with hepatic and multiple lymphadenopathy responsive on systemic therapy.    History of Present Illness   Ms. 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 Her most recent imaging showed progression on pembrolizumab monotherapy.      Her oncologic history started in 2017 where she presented with unintentional 20 Ib weight loss. Lab work-up showed microcytic anemia. A colonoscopy on 7/15/17 showed multiple lesions occupying the cecum, hepatic flexure and ascending colon. Biopsy was obtained and showed poorly differentiated adenocarcinoma in the hepatic flexure and moderately differentiated adenocarcinoma in the cecal and descending colon areas.  Staging CT abdomen and pelvis on 17 showed evidence of multiple retroperitoneal lymph nodes, right ovarian mass as well as a 2 cm lesion in the dome of the liver. CT chest showed no evidence of pulmonary metastasis.       The patient underwent subtotal colectomy on 17. Surgical pathology showed 4 foci of tumors with  a 4.5 cm high-grade hepatic flexure adenocarcinoma, 3 cm cecal tumor, 3 cm ascending colon tumor and 1.6 cm descending colon low-grade adenocarcinoma. There was evident LVSI in the hepatic flexure and cecal primary as well as positive PNI in the cecal primary tumors.  There was positive 6/24 dissected nodes, negative final surgical margins.  Genetic analysis showed K-parvez wild-type and BRAF mutated.  CEA was 13.6 on 7/13/17.     Staging PET/CT on 8/25/17 showed multiple FDG avid liver metastasis, retroperitoneal lymphadenopathy, suspicious right hilar node as well as suspicious left 10th rib uptake.  CT-guided biopsy from retroperitoneal lymph node on 9/8/2017 was consistent with metastatic adenocarcinoma of colon primary.     The patient completed 6 cycles of Xeloda and Avastin (9/20/17-1/24/18).  Interval imaging on 4/13/18 showed mixed response involving enlarged mesenteric lymphadenopathy and stable retroperitoneal, periportal and pericaval lymph nodes.     She was then switched to pembrolizumab monotherapy since 4/23/18. She completed 10 cycles (last cycle on 6/21/21). Interval imaging on 6/25/21 demonstrated interval increase in size of lymph node in the slava hepatis (6.8 cm from 3.2 cm) and left iliac chain (2.2 cm from 1.8 cm) consistent with disease progression. She was seen in our clinic at that time, and an agreement was reached to treat the slava hepatis node and monitor the left iliac node. The slava hepatic node was treated to targeted radiotherapy and received 2,000 cGy in 5 fractions (7/12/21--7/16/21).    She was continued on pembrolizumab monotherapy. Surveillance imaging studies (most recent PET/CT on 11/22/22) have since demonstrated gradual increase in the size of the left iliac node without noting other metastatic focus. A referral was made for her to again see us and discuss targeted radiotherapy to the left iliac node.    On interview, Ms. Moreno is doing well and has no pressing symptoms or  concerns.     Past Medical History:   Epigastric pain  Acute blood loss  Left shoulder pain  Allergic rhinitis  Meningioma (status post surgical resection on 10/2015)  Bilateral inguinal hernia  Chronic cough  Essential hypertension  Fatigue  Low back pain  Restless leg syndrome  Polymyalgia rheumatica    Past Surgical History:   Craniotomy for meningioma resection (10/2015)  Subtotal colectomy (08/2017)    Chemotherapy History:  Per HPI    Radiation History:  Per HPI    Pregnant: Post-menopausal  Implanted Cardiac Devices: No    Medications:  Current Outpatient Medications   Medication     apixaban ANTICOAGULANT (ELIQUIS) 2.5 MG tablet     hydrochlorothiazide (HYDRODIURIL) 12.5 MG tablet     metoprolol tartrate (LOPRESSOR) 50 MG tablet     omeprazole (PRILOSEC) 20 MG DR capsule     pramipexole (MIRAPEX) 0.25 MG tablet     simvastatin (ZOCOR) 20 MG tablet     fluticasone (FLONASE) 50 MCG/ACT nasal spray     ondansetron (ZOFRAN) 8 MG tablet     triamcinolone (KENALOG) 0.1 % external lotion     No current facility-administered medications for this visit.     Allergies: Cortisone    Social History:  Lives in [place], [place]    Family History:  No family history on file.    Review of Systems   A 10-point review of systems was performed. Pertinent findings are noted in the HPI.    Physical Exam   ECOG Status: 2-3  Vitals:  Pulse 67   Resp 18   Wt 59.4 kg (131 lb)   SpO2 97%   Gen: Alert, in NAD, sitting in wheelchair, frail  Head: NC/AT  Eyes: PERRL, EOMI, sclera anicteric  Pulm: No wheezing, stridor or respiratory distress  Musculoskeletal: Normal bulk and tone  Skin: Normal color and turgor  Neuro: A/Ox3, CN II-XII intact    Imaging/Path/Labs   Imaging: PET/CT 11/22/22  Bilateral iliac nodes with FDG uptake, the left iliac node has been progressing while the right iliac node has been stable in comparison to prior      Path: Reviewed    Labs: Reviewed    Assessment    Ms. Moreno is a 88 year old female with diffuse  metastatic adenocarcinoma of colon overall well controlled on Keytruda presenting with an oligoprogressive site in the left iliac chain.    Plan   We discussed our recommendation is to proceed with targeted radiotherapy to the left iliac node to confer durable disease control. We will observe the right iliac node given that it has been stable. The technical aspects, logistics and side effects anticipated were explained in details. Her treatment will likely consist of 5 once-daily fractions.    Ms. Moreno will see Dr. Aldana for follow up in 01/2023, but she understands the rationale for aggressive treatment is to see if this can keep her on Keytruda, a regimen she well tolerated. An informed consent was obtained. We will bring her back for simulation CT next week.    The patient was seen and discussed with staff, Dr. Ramos.    aKti Law MD  Resident, PGY-5  Department of Radiation Oncology  [facility]    I was present with the resident during the visit. I discussed the case with the resident and agree with the note as documented by the resident.    Caleb Ramos M.D.  Department of Radiation Oncology  [facility]

## 2022-12-27 ENCOUNTER — OFFICE VISIT (OUTPATIENT)
Dept: RADIATION THERAPY | Facility: OUTPATIENT CENTER | Age: 87
End: 2022-12-27

## 2022-12-27 DIAGNOSIS — C18.9 MALIGNANT NEOPLASM OF COLON, UNSPECIFIED PART OF COLON (H): Primary | ICD-10-CM

## 2022-12-27 NOTE — PROGRESS NOTES
The patient underwent CT simulation.     Caleb Ramos M.D.  Department of Radiation Oncology  [facility]

## 2022-12-27 NOTE — LETTER
12/27/2022         RE: Nubia Moreno  [address] [facility] [address]        Dear Colleague,    Thank you for referring your patient, Nubia Moreno, to the RAD[facility]ATION TH[facility]APY CENTER. Please see a copy of my visit note below.    The patient underwent CT simulation.     Caleb Ramos M.D.  Department of Radiation Oncology  [facility]         Again, thank you for allowing me to participate in the care of your patient.        Sincerely,        Caleb Ramos MD

## 2023-01-01 ENCOUNTER — HEALTH MAINTENANCE LETTER (OUTPATIENT)
Age: 88
End: 2023-01-01

## 2023-01-01 ENCOUNTER — TELEPHONE (OUTPATIENT)
Dept: RADIATION THERAPY | Facility: OUTPATIENT CENTER | Age: 88
End: 2023-01-01

## 2023-01-02 ENCOUNTER — APPOINTMENT (OUTPATIENT)
Dept: RADIATION THERAPY | Facility: OUTPATIENT CENTER | Age: 88
End: 2023-01-02
Payer: COMMERCIAL

## 2023-01-03 ENCOUNTER — APPOINTMENT (OUTPATIENT)
Dept: RADIATION THERAPY | Facility: OUTPATIENT CENTER | Age: 88
End: 2023-01-03
Payer: COMMERCIAL

## 2023-01-04 ENCOUNTER — APPOINTMENT (OUTPATIENT)
Dept: RADIATION THERAPY | Facility: OUTPATIENT CENTER | Age: 88
End: 2023-01-04
Payer: COMMERCIAL

## 2023-01-04 ENCOUNTER — OFFICE VISIT (OUTPATIENT)
Dept: RADIATION THERAPY | Facility: OUTPATIENT CENTER | Age: 88
End: 2023-01-04
Payer: COMMERCIAL

## 2023-01-04 VITALS
DIASTOLIC BLOOD PRESSURE: 80 MMHG | SYSTOLIC BLOOD PRESSURE: 146 MMHG | HEART RATE: 74 BPM | OXYGEN SATURATION: 97 % | RESPIRATION RATE: 18 BRPM

## 2023-01-04 DIAGNOSIS — C18.9 MALIGNANT NEOPLASM OF COLON, UNSPECIFIED PART OF COLON (H): Primary | ICD-10-CM

## 2023-01-04 ASSESSMENT — PAIN SCALES - GENERAL: PAINLEVEL: NO PAIN (0)

## 2023-01-04 NOTE — PROGRESS NOTES
[facility]  SPECIALIZING IN BREAKTHROUGHS  Radiation Oncology    On Treatment Visit Note      Nubia Moreno      Date: 2023   MRN: [id number]   : 1934  Diagnosis: metastatic colon cancer, lymph node      Reason for Visit:  On Radiation Treatment Visit     Treatment Summary to Date  Treatment Site: L pelvic lymph node Current Dose: 1350/2250 cGy Fractions: 3/5      Chemotherapy  Chemo concurrent with radx?: No    Subjective:   Doing well. No acute complaints. ? Single episode of diarrhea yesterday, since resolved. Energy okay.     Nursing ROS:      Skin  Skin Reaction: 0 - No changes        Cardiovascular  Respiratory effort: 1 - Normal - without distress  Gastrointestinal  Diarrhea: 1 - Abdominal cramping, two or less soft or liquid bowel movements  GI Note: mild bowel change. not taking any otc.  Genitourinary  Urinary Status: 0 - Normal     Pain Assessment  0-10 Pain Scale: 0      Objective:   BP (!) 146/80   Pulse 74   Resp 18   SpO2 97%   NAD  Sitting in wheelchair     Labs:  CBC RESULTS: No results for input(s): WBC, RBC, HGB, HCT, MCV, MCH, MCHC, RDW, PLT in the last 90314 hours.  ELECTROLYTES:  No results for input(s): NA, POTASSIUM, CHLORIDE, GARCÍA, CO2, BUN, CR, GLC in the last 00196 hours.    Assessment:  Ms. Moreno is a 88 year old female with diffuse metastatic adenocarcinoma of colon overall well controlled on Keytruda presenting with an oligoprogressive site in the left iliac chain.  Tolerating radiation therapy well.  All questions and concerns addressed.    Plan:   1. Continue current therapy.  EOT on Friday, RTC in 1 month.  2. GI bother. Monitor. Consider Imodium PRN.     Mosaiq chart and setup information reviewed  Ports checked    Medication Review  Med list reviewed with patient?: Yes    Educational Topic Discussed  Education Instructions: son is caregiver, here for visit. reviewed possible SE to watch for during radiation.      Caleb Ramos MD

## 2023-01-04 NOTE — LETTER
2023         RE: Nubia Moreno  [address] [facility] [address]        Dear Colleague,    Thank you for referring your patient, Nubia Moreno, to the RADIATION THERAPY CENTER. Please see a copy of my visit note below.    [facility]  SPECIALIZING IN BREAKTHROUGHS  Radiation Oncology    On Treatment Visit Note      Nubia Moreno      Date: 2023   MRN: [id number]   : 1934  Diagnosis: metastatic colon cancer, lymph node      Reason for Visit:  On Radiation Treatment Visit     Treatment Summary to Date  Treatment Site: L pelvic lymph node Current Dose: 1350/2250 cGy Fractions: 3/5      Chemotherapy  Chemo concurrent with radx?: No    Subjective:   Doing well. No acute complaints. ? Single episode of diarrhea yesterday, since resolved. Energy okay.     Nursing ROS:      Skin  Skin Reaction: 0 - No changes        Cardiovascular  Respiratory effort: 1 - Normal - without distress  Gastrointestinal  Diarrhea: 1 - Abdominal cramping, two or less soft or liquid bowel movements  GI Note: mild bowel change. not taking any otc.  Genitourinary  Urinary Status: 0 - Normal     Pain Assessment  0-10 Pain Scale: 0      Objective:   BP (!) 146/80   Pulse 74   Resp 18   SpO2 97%   NAD  Sitting in wheelchair     Labs:  CBC RESULTS: No results for input(s): WBC, RBC, HGB, HCT, MCV, MCH, MCHC, RDW, PLT in the last 00831 hours.  ELECTROLYTES:  No results for input(s): NA, POTASSIUM, CHLORIDE, GARCÍA, CO2, BUN, CR, GLC in the last 63119 hours.    Assessment:  Ms. Moreno is a 88 year old female with diffuse metastatic adenocarcinoma of colon overall well controlled on Keytruda presenting with an oligoprogressive site in the left iliac chain.  Tolerating radiation therapy well.  All questions and concerns addressed.    Plan:   1. Continue current therapy.  EOT on Friday, RTC in 1 month.  2. GI bother. Monitor. Consider Imodium PRN.     Mosaiq chart and setup information reviewed  Ports  checked    Medication Review  Med list reviewed with patient?: Yes    Educational Topic Discussed  Education Instructions: son is caregiver, here for visit. reviewed possible SE to watch for during radiation.      Caleb Ramos MD

## 2023-01-05 ENCOUNTER — APPOINTMENT (OUTPATIENT)
Dept: RADIATION THERAPY | Facility: OUTPATIENT CENTER | Age: 88
End: 2023-01-05
Payer: COMMERCIAL

## 2023-01-06 ENCOUNTER — APPOINTMENT (OUTPATIENT)
Dept: RADIATION THERAPY | Facility: OUTPATIENT CENTER | Age: 88
End: 2023-01-06
Payer: COMMERCIAL

## 2023-01-11 ENCOUNTER — DOCUMENTATION ONLY (OUTPATIENT)
Dept: RADIATION ONCOLOGY | Facility: CLINIC | Age: 88
End: 2023-01-11

## 2023-01-11 NOTE — PROGRESS NOTES
Radiotherapy Treatment Summary              PATIENT: Nubia Moreno  MEDICAL RECORD NO: [id number]   : 1934    DIAGNOSIS: Metastatic colon cancer  INTENT OF RADIOTHERAPY: Palliative  PATHOLOGY: Adenocarcinoma                                   STAGE: M1  CONCURRENT SYSTEMIC THERAPY: None         ONCOLOGIC HISTORY: Ms. Moreno is a 88 year old female with diffuse metastatic adenocarcinoma of colon overall well controlled on Keytruda presenting with an oligoprogressive site in the left iliac chain. She underwent palliative RT.              PRIOR RADIATION TREATMENT:  21 to 21: Abdomen, 2000 cGy in 5 fractions    CURRENT SITE OF TREATMENT:  Left pelvic node    DATES  OF TREATMENT:  2020 treatment 2023    TOTAL DOSE OF TREATMENT:  2250 cGy in 5 fractions    DOSE PER FRACTION OF TREATMENT:  450 cGy       COMMENT/TOXICITY:                  GI grade 1    PAIN MANAGEMENT:                           None    FOLLOW UP PLAN:  1. RTC in 1 month.   CC  Patient Care Team:  João Lassiter MD as PCP - General (Family Medicine)  Caleb Ramos MD as MD (Radiation Oncology)  Caleb Ramos MD as Assigned Cancer Care Provider       Caleb Ramos M.D.  Department of Radiation Oncology  [facility]

## 2023-02-06 ENCOUNTER — OFFICE VISIT (OUTPATIENT)
Dept: RADIATION THERAPY | Facility: OUTPATIENT CENTER | Age: 88
End: 2023-02-06
Payer: COMMERCIAL

## 2023-02-06 VITALS
OXYGEN SATURATION: 95 % | SYSTOLIC BLOOD PRESSURE: 135 MMHG | DIASTOLIC BLOOD PRESSURE: 82 MMHG | HEART RATE: 76 BPM | RESPIRATION RATE: 20 BRPM | WEIGHT: 128 LBS

## 2023-02-06 DIAGNOSIS — C18.9 MALIGNANT NEOPLASM OF COLON, UNSPECIFIED PART OF COLON (H): Primary | ICD-10-CM

## 2023-02-06 NOTE — PROGRESS NOTES
Anesthesia ROS/Med Hx        Pulmonary Review:    Negative for pulmonary    Neuro/Psych Review:    Negative for all neuro/psych ROS    Cardiovascular Review:    Pt. positive for hypertension    GI/HEPATIC/RENAL Review:    Pt. positive for GERD - well controlled    End/Other Review:    Pt. positive for anemia  Pt. positive for obesity       Anesthesia Plan     ASA Status: 2  Anesthesia Type: MAC  Induction: Intravenous  Reviewed: NPO Status, Medications, Past Med History, Problem List, Allergies and Patient Summary  The proposed anesthetic plan, including its risks and benefits, have been discussed with the Patient - along with the risks and benefits of alternatives.  Questions were encouraged and answered and the patient and/or representative agrees to proceed.  Blood Products: Not Anticipated      Physical Exam  Mallampati: I  TM Distance: >3 FB  Neck ROM: Full  Cardio Rhythm: Regular  Cardio Rate: Normal  cardiovascular exam normal  Breath sounds clear to auscultation:  Yes  pulmonary exam normal  abdominal exam normal  dental exam normal                   Radiation Oncology Note    HPI: Ms. Moreno is a 88 year old female with diffuse metastatic adenocarcinoma of colon overall well controlled on Keytruda presenting with an oligoprogressive site in the left iliac chain. She underwent palliative RT.              Radiation Treatments  1. 7/12/21 to 7/16/21: Abdomen, 2000 cGy in 5 fractions  2. 1/2/2020 treatment 1/6/2023: Left pelvic node, 2250 cGy in 5 fractions    The patient returns for follow up.     She is overall doing well. Had mild fatigue posttreatment, but since improved.  Mild GI, since improved.     She met with medical oncology team (Dr. Aldana) with current plan to reimage in March 2023.    Plan:  1.  Acute toxicities from radiation therapy treatment are improved.   2.  Return to clinic after scans in March 2023.    Caleb Ramos M.D.  Department of Radiation Oncology  [facility]

## 2023-02-06 NOTE — LETTER
2/6/2023         RE: Nubia Moreno  [address] [facility] [address]        Dear Colleague,    Thank you for referring your patient, Nubia Moreno, to the RAD[facility]ATION THERAPY CENTER. Please see a copy of my visit note below.    Radiation Oncology Note    HPI: Ms. Moreno is a 88 year old female with diffuse metastatic adenocarcinoma of colon overall well controlled on Keytruda presenting with an oligoprogressive site in the left iliac chain. She underwent palliative RT.              Radiation Treatments  1. 7/12/21 to 7/16/21: Abdomen, 2000 cGy in 5 fractions  2. 1/2/2020 treatment 1/6/2023: Left pelvic node, 2250 cGy in 5 fractions    The patient returns for follow up.     She is overall doing well. Had mild fatigue posttreatment, but since improved.  Mild GI, since improved.     She met with medical oncology team (Dr. Aldana) with current plan to reimage in March 2023.    Plan:  1.  Acute toxicities from radiation therapy treatment are improved.   2.  Return to clinic after scans in March 2023.    Caleb Ramos M.D.  Department of Radiation Oncology  [facility]

## 2023-02-06 NOTE — NURSING NOTE
"FOLLOW-UP VISIT    Patient Name: Nubia Moreno      : 1934     Age: 88 year old        ______________________________________________________________________________     Chief Complaint   Patient presents with     Radiation Therapy     Return visit with Dr. Ramos     /82 (BP Location: Left arm, Cuff Size: Adult Regular)   Pulse 76   Resp 20   Wt 58.1 kg (128 lb)   SpO2 95%      Date Radiation Completed: 23    Pain  Denies    Meds  Current Med List Reviewed: Yes  Medication Note:     Imaging  None, upcoming mid March at [facility]    On Chemo?: No  Nausea:no  Bowel: Normal for her - which has some variables both ways. both she and son/caregiver, deny any noticeable bowel side effects after radiation  Bladder: negative  Skin: Warm  Dry  Intact  Energy Level: \"I feel tired\".  Pt and son aware that the treatments cause fatigue.    Other Appointments:     Date  Oncologist: Dr. Aldana now done at [facility], Dr. Flavio Vallejo will take on [facility] patients  3/16/23   Surgeon:      Other Notes:   "

## 2023-03-21 ENCOUNTER — OFFICE VISIT (OUTPATIENT)
Dept: RADIATION THERAPY | Facility: OUTPATIENT CENTER | Age: 88
End: 2023-03-21
Payer: COMMERCIAL

## 2023-03-21 VITALS
OXYGEN SATURATION: 96 % | WEIGHT: 126.6 LBS | DIASTOLIC BLOOD PRESSURE: 76 MMHG | RESPIRATION RATE: 16 BRPM | SYSTOLIC BLOOD PRESSURE: 144 MMHG | HEART RATE: 64 BPM

## 2023-03-21 DIAGNOSIS — C18.9 MALIGNANT NEOPLASM OF COLON, UNSPECIFIED PART OF COLON (H): Primary | ICD-10-CM

## 2023-03-21 ASSESSMENT — PAIN SCALES - GENERAL: PAINLEVEL: NO PAIN (0)

## 2023-03-21 NOTE — PROGRESS NOTES
Radiation Oncology Note    HPI: Ms. Moreno is a 88 year old female with diffuse metastatic adenocarcinoma of colon overall well controlled on Keytruda presenting with an oligoprogressive site in the left iliac chain. She underwent palliative RT.              Radiation Treatments  1. 7/12/21 to 7/16/21: Abdomen, 2000 cGy in 5 fractions  2. 1/2/2020 treatment 1/6/2023: Left pelvic node, 2250 cGy in 5 fractions    The patient returns for follow up.     She is overall doing well. Had mild fatigue posttreatment, but since improved.  Mild GI, since improved.     In the interval, the patient underwent restaging scans in March 2023 per medical oncology team (Dr. Vallejo).  Imaging per review demonstrated partial response to treated left iliac node.  Prior noted small right pelvic node was stable.  No new sites of disease noted.      Patient met with Dr. Vallejo with plan to reimage in 3 months.    Plan:  1.  Partial response to palliative radiation therapy.  2.  Agree with surveillance at this time.  Plan to have the patient return to clinic clinic in 3 months after restaging scans are obtained.    Caleb Ramos M.D.  Department of Radiation Oncology  [facility]

## 2023-03-21 NOTE — NURSING NOTE
Oncology Rooming Note    March 21, 2023 3:29 PM   Nubia Moreno is a 88 year old female who presents for:    Chief Complaint   Patient presents with     Radiation Therapy     Follow up appointment      Initial Vitals: BP (!) 144/76 (BP Location: Left arm, Cuff Size: Adult Regular)   Pulse 64   Resp 16   Wt 57.4 kg (126 lb 9.6 oz)   SpO2 96%  There is no height or weight on file to calculate BMI. There is no height or weight on file to calculate BSA.  No Pain (0) Comment: Data Unavailable   No LMP recorded. Patient is postmenopausal.  Allergies reviewed: Yes  Medications reviewed: Yes    Medications: Medication refills not needed today.  Pharmacy name entered into [email or web address]: [id number] [place], [place] - [address]    Clinical concerns: follow up today with Dr. Richard Tobar RN

## 2023-03-21 NOTE — LETTER
3/21/2023         RE: Nubia Moreno  [address] [facility] [address]      Dear Colleague,    Thank you for referring your patient, Nubia Moreno, to the RAD[facility]ATION THERAPY CENTER. Please see a copy of my visit note below.    Radiation Oncology Note    HPI: Ms. Moreno is a 88 year old female with diffuse metastatic adenocarcinoma of colon overall well controlled on Keytruda presenting with an oligoprogressive site in the left iliac chain. She underwent palliative RT.            Radiation Treatments  1. 7/12/21 to 7/16/21: Abdomen, 2000 cGy in 5 fractions  2. 1/2/2020 treatment 1/6/2023: Left pelvic node, 2250 cGy in 5 fractions    The patient returns for follow up.     She is overall doing well. Had mild fatigue posttreatment, but since improved.  Mild GI, since improved.     In the interval, the patient underwent restaging scans in March 2023 per medical oncology team (Dr. Vallejo).  Imaging per review demonstrated partial response to treated left iliac node.  Prior noted small right pelvic node was stable.  No new sites of disease noted.      Patient met with Dr. Vallejo with plan to reimage in 3 months.    Plan:  1.  Partial response to palliative radiation therapy.  2.  Agree with surveillance at this time.  Plan to have the patient return to clinic clinic in 3 months after restaging scans are obtained.    Caleb Ramos M.D.  Department of Radiation Oncology  [facility]

## 2023-06-13 NOTE — TELEPHONE ENCOUNTER
Call received from Ayden Nubia's son and main caregiver, he shared she is now in hospice care.  Due to this all follow up's in this clinic will be canceled.   Thanked Ayden for sharing update with this team.